# Patient Record
Sex: MALE | Race: WHITE | NOT HISPANIC OR LATINO | URBAN - METROPOLITAN AREA
[De-identification: names, ages, dates, MRNs, and addresses within clinical notes are randomized per-mention and may not be internally consistent; named-entity substitution may affect disease eponyms.]

---

## 2020-03-04 NOTE — PATIENT DISCUSSION
Discuss options of drops vs SLT vs close following.  She would like to get [facility] recommendation.  Prefers SLT if treatment is recommended.

## 2020-05-11 NOTE — PROCEDURE NOTE: SURGICAL
Prior to commencing surgery, patient identification, surgical procedure, site, and side were confirmed by Dr. Vinod Romero. Following topical proparacaine anesthesia, the patient was positioned at the YAG laser, a contact lens coupled to the cornea of the right eye with methylcellulose and a peripheral iridotomy placed using 2.1 Mj x5 without complication. Attention was turned to the left eye and the patient was positioned at the YAG laser, a contact lens coupled to the cornea with methylcellulose and a peripheral iridotomy placed using 3.4 Mj x 8 without complication. Excess methylcellulose was washed from both eyes, one drop of Econopred Plus 1% instilled and the patient returned to the holding area having tolerated the procedure well and without complication. <br />[facility]:[id number]<MZ /><br />

## 2020-06-12 NOTE — PATIENT DISCUSSION
GOOD POSTOPERATIVE APPEARANCE.  PATENT IRIDOTOMY.  ANGLES BETTER HOWEVER DISCUSSED CONSIDERING CATARACT SURGERY SOONER TO FULLY OPEN.

## 2020-07-08 NOTE — PATIENT DISCUSSION
monovision trial today OS for laurie and OD for -2.00. Disc with pt that she is frustrated with her vision and ready for IOL OU. --pt will call with her decision.

## 2020-07-24 NOTE — PATIENT DISCUSSION
No retinal detachment or retinal tear noted. - ID called  not sure if she needs broad spectrum antibiotics either pt s/p abx as outpt- no improvement- has persistent findings on CT chest in right lung- now r/o TB w/u as pt has risk factors given immunocompromised state due to chemo, being from endemic area.  appreciate ID and pulm input on case  no outpt w/u for TB  if afb neg X3 today then dc home   [place] [facility] [place] s/p abx as outpt- no improvement- has persistent findings on CT chest in right lung- now r/o TB w/u as pt has risk factors given immunocompromised state due to chemo, being from endemic area.  appreciate ID and pulm input on case  no outpt w/u for TB

## 2021-06-29 NOTE — PATIENT DISCUSSION
The patient feels that the cataract is significantly impacting daily activities and has elected cataract surgery. The risks, benefits, and alternatives to surgery were discussed. The patient elects to proceed with surgery. 0 = swallows foods/liquids without difficulty

## 2022-01-24 ENCOUNTER — NEW PATIENT (OUTPATIENT)
Dept: URBAN - METROPOLITAN AREA CLINIC 26 | Facility: CLINIC | Age: 82
End: 2022-01-24

## 2022-01-24 ENCOUNTER — IMPORTED ENCOUNTER (OUTPATIENT)
Dept: URBAN - METROPOLITAN AREA CLINIC 31 | Facility: CLINIC | Age: 82
End: 2022-01-24

## 2022-01-24 VITALS
HEIGHT: 67 IN | BODY MASS INDEX: 26.37 KG/M2 | WEIGHT: 168 LBS | SYSTOLIC BLOOD PRESSURE: 139 MMHG | DIASTOLIC BLOOD PRESSURE: 75 MMHG | HEART RATE: 67 BPM

## 2022-01-24 DIAGNOSIS — H35.3132: ICD-10-CM

## 2022-01-24 DIAGNOSIS — H04.123: ICD-10-CM

## 2022-01-24 DIAGNOSIS — H34.11: ICD-10-CM

## 2022-01-24 PROBLEM — H47.011: Noted: 2022-01-24

## 2022-01-24 PROCEDURE — 92134 CPTRZ OPH DX IMG PST SGM RTA: CPT

## 2022-01-24 PROCEDURE — 92083 EXTENDED VISUAL FIELD XM: CPT

## 2022-01-24 PROCEDURE — 92235 FLUORESCEIN ANGRPH MLTIFRAME: CPT

## 2022-01-24 PROCEDURE — 99204 OFFICE O/P NEW MOD 45 MIN: CPT

## 2022-01-24 PROCEDURE — 92250 FUNDUS PHOTOGRAPHY W/I&R: CPT

## 2022-01-24 ASSESSMENT — TONOMETRY
OS_IOP_MMHG: 14
OD_IOP_MMHG: 12

## 2022-01-24 ASSESSMENT — VISUAL ACUITY: OS_SC: 20/25+2

## 2022-02-11 ENCOUNTER — FOLLOW UP (OUTPATIENT)
Dept: URBAN - METROPOLITAN AREA CLINIC 26 | Facility: CLINIC | Age: 82
End: 2022-02-11

## 2022-02-11 DIAGNOSIS — H04.123: ICD-10-CM

## 2022-02-11 DIAGNOSIS — H40.1110: ICD-10-CM

## 2022-02-11 DIAGNOSIS — H35.3132: ICD-10-CM

## 2022-02-11 DIAGNOSIS — H21.1X1: ICD-10-CM

## 2022-02-11 DIAGNOSIS — H34.11: ICD-10-CM

## 2022-02-11 PROCEDURE — J3490AVA AVASTIN

## 2022-02-11 PROCEDURE — 92020 GONIOSCOPY: CPT

## 2022-02-11 PROCEDURE — 92012 INTRM OPH EXAM EST PATIENT: CPT

## 2022-02-11 PROCEDURE — 67028 INJECTION EYE DRUG: CPT

## 2022-02-11 ASSESSMENT — TONOMETRY
OS_IOP_MMHG: 6
OD_IOP_MMHG: 25
OD_IOP_MMHG: 22

## 2022-02-11 ASSESSMENT — VISUAL ACUITY: OS_SC: 20/30-1

## 2022-04-02 ASSESSMENT — TONOMETRY
OD_IOP_MMHG: 17
OS_IOP_MMHG: 12

## 2022-04-02 ASSESSMENT — VISUAL ACUITY: OS_CC: 20/20-2

## 2023-01-04 NOTE — PATIENT DISCUSSION
Assessment/Plan:    No problem-specific Assessment & Plan notes found for this encounter  Diagnoses and all orders for this visit:    Pain in right foot  -     Case request operating room: OPEN REDUCTION W/ INTERNAL FIXATION (ORIF) FOOT, ORIF dupree fracture 5th met; Standing  -     Comprehensive metabolic panel; Future  -     CBC and differential; Future  -     Case request operating room: OPEN REDUCTION W/ INTERNAL FIXATION (ORIF) FOOT, ORIF dupree fracture 5th met    Open fracture of base of fifth metatarsal bone of right foot at metaphyseal-diaphyseal junction with delayed healing, subsequent encounter  -     Case request operating room: OPEN REDUCTION W/ INTERNAL FIXATION (ORIF) FOOT, ORIF dupree fracture 5th met; Standing  -     Comprehensive metabolic panel; Future  -     CBC and differential; Future  -     Case request operating room: OPEN REDUCTION W/ INTERNAL FIXATION (ORIF) FOOT, ORIF dupree fracture 5th met    Other orders  -     Nursing Communication Warmimg Interventions Implemented; Standing  -     Nursing Communication CHG bath, have staff wash entire body (neck down) per pre-op bathing protocol  Routine, evening prior to, and day of surgery ; Standing  -     Nursing Communication Swab both nares with Povidone-Iodine solution, EXCLUDE if patient has shellfish/Iodine allergy  Routine, day of surgery, on call to OR; Standing  -     chlorhexidine (PERIDEX) 0 12 % oral rinse 15 mL  -     Void on call to OR; Standing  -     Insert peripheral IV;  Standing  -     ceFAZolin (ANCEF) 2,000 mg in dextrose 5 % 100 mL IVPB  -     chlorhexidine (HIBICLENS) 4 % topical liquid      -X-rays from initial injury were reviewed and do show CABG of the fifth metatarsal at the Dupree fracture area, these are compared to the most previous x-rays and do show no interval healing   - We will plan for ORIF of Dupree fracture utilizing intramedullary screw, if this is unable to be placed down the medullary canal, we will plan Monitor. for plate and screw  - Due to his commute and this being his right foot he has to drive 40 minutes for work and will be unable to drive in a cam boot he will need to be in a cam boot at least for 6 weeks status post surgical intervention, he will need to be out of work for 6 weeks  -His case was also discussed in detail with Dr Danielle Thakur    Patient was counseled and educated on the condition and the diagnosis  The diagnosis, treatment options and prognosis were discussed with the patient  The patient failed conservative care at this time and wished to proceed with surgical treatment  Explained surgical details and post-op course  Discussed all risks and complications related to the patient's condition and surgery  The benefits of surgery were also discussed  The patient understood that the surgery would not guarantee desirable outcome  All questions and concerns were addressed and the consent was signed  Subjective:      Patient ID: Antonio Liao is a 53 y o  male  Patient presents for evaluation management of his right foot pain, states he sustained a fracture in November of his right fifth metatarsal has been in boot and nonweightbearing on this area since then  He did have repeat x-rays and the x-ray showed widening and a decreased healing across the fracture site      The following portions of the patient's history were reviewed and updated as appropriate: allergies, current medications, past family history, past medical history, past social history, past surgical history and problem list     Review of Systems   Constitutional: Negative for chills and fever  HENT: Negative for ear pain and sore throat  Eyes: Negative for pain and visual disturbance  Respiratory: Negative for cough and shortness of breath  Cardiovascular: Negative for chest pain and palpitations  Gastrointestinal: Negative for abdominal pain and vomiting  Genitourinary: Negative for dysuria and hematuria  Musculoskeletal: Negative for arthralgias and back pain  Skin: Negative for color change and rash  Neurological: Negative for seizures and syncope  All other systems reviewed and are negative  Objective:      Ht 5' 11" (1 803 m)   Wt 118 kg (260 lb)   BMI 36 26 kg/m²          Physical Exam  Vitals reviewed  Constitutional:       Appearance: Normal appearance  He is obese  HENT:      Head: Normocephalic and atraumatic  Nose: Nose normal       Mouth/Throat:      Mouth: Mucous membranes are moist    Eyes:      Pupils: Pupils are equal, round, and reactive to light  Cardiovascular:      Pulses: Normal pulses  Pulmonary:      Effort: Pulmonary effort is normal    Musculoskeletal:         General: Swelling and tenderness present  Comments: Significant metatarsus adductus right foot with lateral column overload, pain with palpation along the Hoang fracture area of the right foot  There is some edema associated with this, no ecchymosis   Skin:     Capillary Refill: Capillary refill takes less than 2 seconds  Neurological:      General: No focal deficit present  Mental Status: He is alert and oriented to person, place, and time  Mental status is at baseline

## 2024-05-31 NOTE — PATIENT DISCUSSION
Nerve Block    Date/Time: 5/31/2024 2:30 PM    Performed by: Hector Mcgee MD  Authorized by: Hector Mcgee MD    Block Type: popliteal  Laterality:  Right  Patient Location:  Pre-op  Indication: post-op pain management at surgeon's request and at surgeon's request    Surgeon:  Katelyn  Preanesthetic Checklist Patient Identified (2 criteria), Block Plan Confirmed, Resuscitation Equipment Available, Supplemental O2 (if needed), Allergies Confirmed, Block Site Marked (if applicable), Monitors Applied, Aseptic Technique, Coagulation Status, Necessary Block Equipment Present, Timeout Performed, IV Access Functioning, Consent Verified, Drugs/Solutions Labeled and Sedation Given (if needed)    Patient Position:  Supine  Prep:  Chlorhexidine gluconate (CHG)  Max Sterile Barrier Technique:  Hand Washing, Cap/Mask, Sterile gloves and Sterile gel & probe cover  Monitoring:  Blood pressure, continuous capnography, continuous pulse oximetry, EKG and heart rate  Injection Technique:  Single-shot  Procedures: ultrasound guided and ultrasound permanent image saved    Needle Type:  Stimulating  Needle Gauge:  20 G  Needle Length:  4 in  Needle Localization:  Ultrasound guidance  Test Dose:  Negative  Test Dose Volume (ml):  2   in-plane  Physical status during block:  Sedated  Medications Administered  bupivacaine-EPINEPHrine (PF) 0.5 % -[id number] - Infiltration   20 mL - 5/31/2024 2:30:00 PM  dexAMETHasone (PF) (DECADRON) 10 mg/mL - Infiltration   5 mg - 5/31/2024 2:30:00 PM  Injection Assessment:  Negative aspiration for heme, no paresthesia on injection, incremental injection, local visualized surrounding nerve on ultrasound and no resistance to injection  Patient Condition:  Tolerated well, no immediate complications  Paresthesia Pain:  None  Heart Rate Change: No    Slowly Injected: Yes    Start Time:5/31/2024 2:30 PM  Stop Time: 5/31/2024 2:34 PM   Risks, benefits, alternatives to block discussed pre-block.  All  The types of intraocular lenses were reviewed with the patient along with a discussion of their various strengths and weaknesses. questions answered. Block performed at proceduralist's request for post-op pain management. Patient agreed to proceed. No pain/paresthesias with needle placement or injection. Pt tolerated procedure well. Sterile ultrasound probe cover used and ultrasound picture saved to electronic medical record in Epic Media Tab. See Epic for block start/stop times and medications administered.